# Patient Record
Sex: MALE | Race: BLACK OR AFRICAN AMERICAN | NOT HISPANIC OR LATINO | Employment: FULL TIME | URBAN - METROPOLITAN AREA
[De-identification: names, ages, dates, MRNs, and addresses within clinical notes are randomized per-mention and may not be internally consistent; named-entity substitution may affect disease eponyms.]

---

## 2019-11-09 ENCOUNTER — HOSPITAL ENCOUNTER (EMERGENCY)
Facility: HOSPITAL | Age: 54
Discharge: HOME | End: 2019-11-10
Attending: EMERGENCY MEDICINE | Admitting: EMERGENCY MEDICINE
Payer: COMMERCIAL

## 2019-11-09 ENCOUNTER — APPOINTMENT (EMERGENCY)
Dept: RADIOLOGY | Facility: HOSPITAL | Age: 54
End: 2019-11-09
Payer: COMMERCIAL

## 2019-11-09 DIAGNOSIS — R56.9 NEW ONSET SEIZURE (HCC): Primary | ICD-10-CM

## 2019-11-09 LAB
ALBUMIN SERPL BCP-MCNC: 3.5 G/DL (ref 3.5–5)
ALP SERPL-CCNC: 98 U/L (ref 46–116)
ALT SERPL W P-5'-P-CCNC: 17 U/L (ref 12–78)
ANION GAP SERPL CALCULATED.3IONS-SCNC: 6 MMOL/L (ref 4–13)
AST SERPL W P-5'-P-CCNC: 16 U/L (ref 5–45)
BASOPHILS # BLD AUTO: 0.04 THOUSANDS/ΜL (ref 0–0.1)
BASOPHILS NFR BLD AUTO: 0 % (ref 0–1)
BILIRUB SERPL-MCNC: 0.7 MG/DL (ref 0.2–1)
BUN SERPL-MCNC: 17 MG/DL (ref 5–25)
CALCIUM SERPL-MCNC: 8.4 MG/DL (ref 8.3–10.1)
CHLORIDE SERPL-SCNC: 104 MMOL/L (ref 100–108)
CO2 SERPL-SCNC: 28 MMOL/L (ref 21–32)
CREAT SERPL-MCNC: 1.24 MG/DL (ref 0.6–1.3)
EOSINOPHIL # BLD AUTO: 0.13 THOUSAND/ΜL (ref 0–0.61)
EOSINOPHIL NFR BLD AUTO: 1 % (ref 0–6)
ERYTHROCYTE [DISTWIDTH] IN BLOOD BY AUTOMATED COUNT: 13.7 % (ref 11.6–15.1)
GFR SERPL CREATININE-BSD FRML MDRD: 65 ML/MIN/1.73SQ M
GLUCOSE SERPL-MCNC: 97 MG/DL (ref 65–140)
HCT VFR BLD AUTO: 44.3 % (ref 36.5–49.3)
HGB BLD-MCNC: 14.2 G/DL (ref 12–17)
IMM GRANULOCYTES # BLD AUTO: 0.03 THOUSAND/UL (ref 0–0.2)
IMM GRANULOCYTES NFR BLD AUTO: 0 % (ref 0–2)
LYMPHOCYTES # BLD AUTO: 3.4 THOUSANDS/ΜL (ref 0.6–4.47)
LYMPHOCYTES NFR BLD AUTO: 31 % (ref 14–44)
MCH RBC QN AUTO: 29.6 PG (ref 26.8–34.3)
MCHC RBC AUTO-ENTMCNC: 32.1 G/DL (ref 31.4–37.4)
MCV RBC AUTO: 92 FL (ref 82–98)
MONOCYTES # BLD AUTO: 1.01 THOUSAND/ΜL (ref 0.17–1.22)
MONOCYTES NFR BLD AUTO: 9 % (ref 4–12)
NEUTROPHILS # BLD AUTO: 6.4 THOUSANDS/ΜL (ref 1.85–7.62)
NEUTS SEG NFR BLD AUTO: 59 % (ref 43–75)
NRBC BLD AUTO-RTO: 0 /100 WBCS
PLATELET # BLD AUTO: 204 THOUSANDS/UL (ref 149–390)
PMV BLD AUTO: 11.2 FL (ref 8.9–12.7)
POTASSIUM SERPL-SCNC: 3.6 MMOL/L (ref 3.5–5.3)
PROT SERPL-MCNC: 7.1 G/DL (ref 6.4–8.2)
RBC # BLD AUTO: 4.8 MILLION/UL (ref 3.88–5.62)
SODIUM SERPL-SCNC: 138 MMOL/L (ref 136–145)
TROPONIN I SERPL-MCNC: <0.02 NG/ML
WBC # BLD AUTO: 11.01 THOUSAND/UL (ref 4.31–10.16)

## 2019-11-09 PROCEDURE — 85025 COMPLETE CBC W/AUTO DIFF WBC: CPT | Performed by: EMERGENCY MEDICINE

## 2019-11-09 PROCEDURE — 84484 ASSAY OF TROPONIN QUANT: CPT | Performed by: EMERGENCY MEDICINE

## 2019-11-09 PROCEDURE — 70450 CT HEAD/BRAIN W/O DYE: CPT

## 2019-11-09 PROCEDURE — 93005 ELECTROCARDIOGRAM TRACING: CPT

## 2019-11-09 PROCEDURE — 71045 X-RAY EXAM CHEST 1 VIEW: CPT

## 2019-11-09 PROCEDURE — 36415 COLL VENOUS BLD VENIPUNCTURE: CPT

## 2019-11-09 PROCEDURE — 99285 EMERGENCY DEPT VISIT HI MDM: CPT

## 2019-11-09 PROCEDURE — 80053 COMPREHEN METABOLIC PANEL: CPT | Performed by: EMERGENCY MEDICINE

## 2019-11-09 RX ORDER — DIPHENOXYLATE HYDROCHLORIDE AND ATROPINE SULFATE 2.5; .025 MG/1; MG/1
1 TABLET ORAL DAILY
COMMUNITY

## 2019-11-10 VITALS
HEART RATE: 76 BPM | DIASTOLIC BLOOD PRESSURE: 80 MMHG | OXYGEN SATURATION: 98 % | RESPIRATION RATE: 19 BRPM | WEIGHT: 285 LBS | SYSTOLIC BLOOD PRESSURE: 149 MMHG | TEMPERATURE: 100 F

## 2019-11-10 NOTE — DISCHARGE INSTRUCTIONS
You cannot drive until you are cleared by a physician  Return to the ER for further concerns  Follow up with the neurologist provided and your primary care physician in 1-2 days

## 2019-11-10 NOTE — ED NOTES
Pt with no complaints, able to sit on edge of bed and ambulate without dizziness  Instructed not to drive until f/u with neurologist, verbalized understanding  Ambulated out to car with wife and this RN, gait steady       Hannah Press, RN  11/10/19 0745

## 2019-11-11 ENCOUNTER — TELEPHONE (OUTPATIENT)
Dept: NEUROLOGY | Facility: CLINIC | Age: 54
End: 2019-11-11

## 2019-11-11 NOTE — TELEPHONE ENCOUNTER
Best contact number for patient: [phone]    Emergency Contact name and number: Radha Carlos(Wife) [phone]    Referring provider: [facility] ED    Referring provider Telephone:     Primary Care Provider Name: No PCP states wife    Reason for Appointment/Dx: New onset SZ    Neurology Location patient would like to be seen: [facility] received? Yes                                                 Records Received? No records  Have you ever seen another Neurologist? No  Insurance Information    Insurance Name: [facility]    ID/Policy #: [id number]    Secondary Insurance:    ID/Policy#: Workman's Comp/ Accident/ School  Information      Workman's Comp/Accident/School related?  No    If yes name of Insurance company:    Date of Injury:    Open Claim:     Name and Telephone Number:    Notes:                   Appointment date: _____________________________

## 2019-11-12 LAB
ATRIAL RATE: 88 BPM
P AXIS: 33 DEGREES
PR INTERVAL: 220 MS
QRS AXIS: 21 DEGREES
QRSD INTERVAL: 72 MS
QT INTERVAL: 372 MS
QTC INTERVAL: 450 MS
T WAVE AXIS: 0 DEGREES
VENTRICULAR RATE: 88 BPM

## 2019-11-12 PROCEDURE — 93010 ELECTROCARDIOGRAM REPORT: CPT | Performed by: INTERNAL MEDICINE

## 2019-11-14 NOTE — ED PROVIDER NOTES
History  Chief Complaint   Patient presents with    Seizure - New Onset     wife states patient was in bed, turned off TV and she thought he was asleep, pt suddenly grabbed his chest and started with shaking , jaw was clenched, pt was shaking for a few minutes, not responding  On medic arrival pt was confused and combative, arrives in ER alert and oriented     Pt arrives in the ER with EMS  His wife states that they were laying in bed, pt was asleep, and he suddenly clutched his chest, froze in place, with his jaw clenched and started twitching  Pt was unresponsive to verbal stimuli during the episode  Pt's wife called EMS, and the police arrived to a combative person  Pt later returned to his baseline, ambulated to the restroom without assistance prior to coming to the hospital  Pt denies a hx of seizures  He denies drug use or medication non compliance  He denies ETOH abuse, states that he is a social drinker  Pt is awake and alert, complains of mild pain in his tongue on initial eval        History provided by:  Patient and spouse   used: No    Seizure - New Onset   Seizure activity on arrival: no    Seizure type:  Tonic  Preceding symptoms: no dizziness, no headache, no nausea and no panic    Episode characteristics: abnormal movements, combativeness, generalized shaking, stiffening, tongue biting and unresponsiveness    Episode characteristics: no incontinence    Postictal symptoms: confusion    Return to baseline: yes    Severity:  Moderate  Timing:  Once  Context: not alcohol withdrawal, not change in medication, not drug use, not family hx of seizures, not fever, medical compliance, not possible medication ingestion and not previous head injury    Recent head injury:  No recent head injuries  PTA treatment:  None  History of seizures: no        Prior to Admission Medications   Prescriptions Last Dose Informant Patient Reported?  Taking?   multivitamin (THERAGRAN) TABS 11/9/2019 at Unknown time  Yes Yes   Sig: Take 1 tablet by mouth daily      Facility-Administered Medications: None       History reviewed  No pertinent past medical history  Past Surgical History:   Procedure Laterality Date    KNEE ARTHROSCOPY Right        History reviewed  No pertinent family history  I have reviewed and agree with the history as documented  Social History     Tobacco Use    Smoking status: Never Smoker    Smokeless tobacco: Never Used   Substance Use Topics    Alcohol use: Yes     Comment: socially    Drug use: Never        Review of Systems   Constitutional: Negative for chills and fever  Respiratory: Negative for cough, shortness of breath and wheezing  Cardiovascular: Negative for chest pain and palpitations  Gastrointestinal: Negative for abdominal pain, constipation, diarrhea, nausea and vomiting  Genitourinary: Negative for dysuria, flank pain, hematuria and urgency  Musculoskeletal: Negative for back pain  Skin: Negative for color change and rash  Neurological: Positive for seizures  All other systems reviewed and are negative  Physical Exam  Physical Exam   Constitutional: He is oriented to person, place, and time  He appears well-developed and well-nourished  HENT:   Head: Normocephalic and atraumatic  Abrasions noted to both sides of tongue  No laceration noted  No active bleeding   Eyes: Pupils are equal, round, and reactive to light  EOM are normal    Cardiovascular: Normal rate, regular rhythm and normal heart sounds  Pulmonary/Chest: Effort normal and breath sounds normal    Abdominal: Soft  Bowel sounds are normal  He exhibits no distension and no mass  There is no tenderness  There is no rebound and no guarding  Neurological: He is alert and oriented to person, place, and time  No cranial nerve deficit  Coordination normal    Skin: Skin is warm and dry  Psychiatric: He has a normal mood and affect   His behavior is normal  Judgment and thought content normal    Nursing note and vitals reviewed        Vital Signs  ED Triage Vitals [11/09/19 2231]   Temperature Pulse Respirations Blood Pressure SpO2   100 °F (37 8 °C) 94 20 153/71 95 %      Temp Source Heart Rate Source Patient Position - Orthostatic VS BP Location FiO2 (%)   Tympanic Monitor Sitting Left arm --      Pain Score       No Pain           Vitals:    11/10/19 0115 11/10/19 0130 11/10/19 0145 11/10/19 0230   BP: 154/74 127/78 143/81 149/80   Pulse: 78 84 80 76   Patient Position - Orthostatic VS: Lying            Visual Acuity  Visual Acuity      Most Recent Value   L Pupil Size (mm)  3   R Pupil Size (mm)  3          ED Medications  Medications - No data to display    Diagnostic Studies  Results Reviewed     Procedure Component Value Units Date/Time    Troponin I [[id number]]  (Normal) Collected:  11/09/19 2243    Lab Status:  Final result Specimen:  Blood from Arm, Right Updated:  11/09/19 2312     Troponin I <0 02 ng/mL     Comprehensive metabolic panel [[id number]] Collected:  11/09/19 2243    Lab Status:  Final result Specimen:  Blood from Arm, Right Updated:  11/09/19 2309     Sodium 138 mmol/L      Potassium 3 6 mmol/L      Chloride 104 mmol/L      CO2 28 mmol/L      ANION GAP 6 mmol/L      BUN 17 mg/dL      Creatinine 1 24 mg/dL      Glucose 97 mg/dL      Calcium 8 4 mg/dL      AST 16 U/L      ALT 17 U/L      Alkaline Phosphatase 98 U/L      Total Protein 7 1 g/dL      Albumin 3 5 g/dL      Total Bilirubin 0 70 mg/dL      eGFR 65 ml/min/1 73sq m     Narrative:       [place] guidelines for Chronic Kidney Disease (CKD):     Stage 1 with normal or high GFR (GFR > 90 mL/min/1 73 square meters)    Stage 2 Mild CKD (GFR = 60-89 mL/min/1 73 square meters)    Stage 3A Moderate CKD (GFR = 45-59 mL/min/1 73 square meters)    Stage 3B Moderate CKD (GFR = 30-44 mL/min/1 73 square meters)    Stage 4 Severe CKD (GFR = 15-29 mL/min/1 73 square meters)    Stage 5 End Stage CKD (GFR <15 mL/min/1 73 square meters)  Note: GFR calculation is accurate only with a steady state creatinine    CBC and differential [[id number]]  (Abnormal) Collected:  11/09/19 2243    Lab Status:  Final result Specimen:  Blood from Arm, Right Updated:  11/09/19 2300     WBC 11 01 Thousand/uL      RBC 4 80 Million/uL      Hemoglobin 14 2 g/dL      Hematocrit 44 3 %      MCV 92 fL      MCH 29 6 pg      MCHC 32 1 g/dL      RDW 13 7 %      MPV 11 2 fL      Platelets 581 Thousands/uL      nRBC 0 /100 WBCs      Neutrophils Relative 59 %      Immat GRANS % 0 %      Lymphocytes Relative 31 %      Monocytes Relative 9 %      Eosinophils Relative 1 %      Basophils Relative 0 %      Neutrophils Absolute 6 40 Thousands/µL      Immature Grans Absolute 0 03 Thousand/uL      Lymphocytes Absolute 3 40 Thousands/µL      Monocytes Absolute 1 01 Thousand/µL      Eosinophils Absolute 0 13 Thousand/µL      Basophils Absolute 0 04 Thousands/µL                  CT head without contrast   Final Result by Erick Finn DO (11/10 0142)      No acute intracranial abnormality is seen  Other findings as above  Workstation performed: [id number]         XR chest 1 view portable   ED Interpretation by Samreen Morales DO (11/10 7508)   nad      Final Result by Colin Garcia MD (11/10 1275)      No acute cardiopulmonary disease        Workstation performed: [id number]                    Procedures  ECG 12 Lead Documentation Only  Date/Time: 11/9/2019 10:45 PM  Performed by: Samreen Morales DO  Authorized by: Samreen Morales DO     Indications / Diagnosis:  Seizure  ECG reviewed by me, the ED Provider: yes    Patient location:  ED  Previous ECG:     Previous ECG:  Unavailable    Comparison to cardiac monitor: Yes    Interpretation:     Interpretation: non-specific    Rate:     ECG rate:  88bpm    ECG rate assessment: normal    Rhythm:     Rhythm: sinus rhythm    Ectopy:     Ectopy: none    QRS:     QRS axis:  Normal  Conduction:     Conduction: abnormal      Abnormal conduction: 1st degree    ST segments:     ST segments:  Normal  T waves:     T waves: normal             ED Course                               MDM  Number of Diagnoses or Management Options  New onset seizure [facility]):   Diagnosis management comments: Pt remains asymptomatic in the ER  Will d/c to home with PCP and neurology f/u  Amount and/or Complexity of Data Reviewed  Clinical lab tests: ordered and reviewed  Tests in the radiology section of CPT®: ordered and reviewed    Risk of Complications, Morbidity, and/or Mortality  Presenting problems: high  Diagnostic procedures: high  Management options: moderate    Patient Progress  Patient progress: stable      Disposition  Final diagnoses:   New onset seizure ([address] )     Time reflects when diagnosis was documented in both MDM as applicable and the Disposition within this note     Time User Action Codes Description Comment    11/10/2019  2:06 AM Ellen Mancini Add [R56 9] New onset seizure [facility])       ED Disposition     ED Disposition Condition Date/Time Comment    Discharge Stable Sun Nov 10, 2019  2:06 AM Rosibel Hamilton discharge to home/self care  Follow-up Information     Follow up With Specialties Details Why Andriy Schedule an appointment as soon as possible for a visit in 1 day for follow up [address] Francisco Guillory MD Neurology Schedule an appointment as soon as possible for a visit in 1 day for follow up [address]  [phone]            Discharge Medication List as of 11/10/2019  2:08 AM      CONTINUE these medications which have NOT CHANGED    Details   multivitamin (THERAGRAN) TABS Take 1 tablet by mouth daily, Historical Med           No discharge procedures on file      ED Provider  Electronically Signed by           Paul Dobbs Cam Tapia, DO  11/14/19 1124

## 2019-12-26 ENCOUNTER — HOSPITAL ENCOUNTER (EMERGENCY)
Facility: HOSPITAL | Age: 54
Discharge: HOME | End: 2019-12-26
Attending: EMERGENCY MEDICINE | Admitting: EMERGENCY MEDICINE
Payer: COMMERCIAL

## 2019-12-26 ENCOUNTER — APPOINTMENT (EMERGENCY)
Dept: RADIOLOGY | Facility: HOSPITAL | Age: 54
End: 2019-12-26
Payer: COMMERCIAL

## 2019-12-26 VITALS
WEIGHT: 285 LBS | RESPIRATION RATE: 16 BRPM | HEART RATE: 82 BPM | OXYGEN SATURATION: 96 % | DIASTOLIC BLOOD PRESSURE: 86 MMHG | SYSTOLIC BLOOD PRESSURE: 153 MMHG | TEMPERATURE: 99 F

## 2019-12-26 DIAGNOSIS — R56.9 SEIZURE (HCC): Primary | ICD-10-CM

## 2019-12-26 LAB
ALBUMIN SERPL BCP-MCNC: 3.7 G/DL (ref 3.5–5)
ALP SERPL-CCNC: 92 U/L (ref 46–116)
ALT SERPL W P-5'-P-CCNC: 18 U/L (ref 12–78)
ANION GAP SERPL CALCULATED.3IONS-SCNC: 7 MMOL/L (ref 4–13)
AST SERPL W P-5'-P-CCNC: 18 U/L (ref 5–45)
ATRIAL RATE: 89 BPM
BASOPHILS # BLD AUTO: 0.02 THOUSANDS/ΜL (ref 0–0.1)
BASOPHILS NFR BLD AUTO: 0 % (ref 0–1)
BILIRUB SERPL-MCNC: 0.6 MG/DL (ref 0.2–1)
BUN SERPL-MCNC: 15 MG/DL (ref 5–25)
CALCIUM SERPL-MCNC: 8.7 MG/DL (ref 8.3–10.1)
CHLORIDE SERPL-SCNC: 105 MMOL/L (ref 100–108)
CO2 SERPL-SCNC: 29 MMOL/L (ref 21–32)
CREAT SERPL-MCNC: 1.28 MG/DL (ref 0.6–1.3)
EOSINOPHIL # BLD AUTO: 0.1 THOUSAND/ΜL (ref 0–0.61)
EOSINOPHIL NFR BLD AUTO: 1 % (ref 0–6)
ERYTHROCYTE [DISTWIDTH] IN BLOOD BY AUTOMATED COUNT: 14.1 % (ref 11.6–15.1)
GFR SERPL CREATININE-BSD FRML MDRD: 72 ML/MIN/1.73SQ M
GLUCOSE SERPL-MCNC: 100 MG/DL (ref 65–140)
HCT VFR BLD AUTO: 45.2 % (ref 36.5–49.3)
HGB BLD-MCNC: 15.4 G/DL (ref 12–17)
HOLD SPECIMEN: NORMAL
LYMPHOCYTES # BLD AUTO: 2.38 THOUSANDS/ΜL (ref 0.6–4.47)
LYMPHOCYTES NFR BLD AUTO: 24 % (ref 14–44)
MAGNESIUM SERPL-MCNC: 2.5 MG/DL (ref 1.6–2.6)
MCH RBC QN AUTO: 30.1 PG (ref 26.8–34.3)
MCHC RBC AUTO-ENTMCNC: 34.1 G/DL (ref 31.4–37.4)
MCV RBC AUTO: 88 FL (ref 82–98)
MONOCYTES # BLD AUTO: 0.65 THOUSAND/ΜL (ref 0.17–1.22)
MONOCYTES NFR BLD AUTO: 7 % (ref 4–12)
NEUTROPHILS # BLD AUTO: 6.66 THOUSANDS/ΜL (ref 1.85–7.62)
NEUTS SEG NFR BLD AUTO: 68 % (ref 43–75)
P AXIS: 38 DEGREES
PLATELET # BLD AUTO: 200 THOUSANDS/UL (ref 149–390)
PMV BLD AUTO: 11.1 FL (ref 8.9–12.7)
POTASSIUM SERPL-SCNC: 4.4 MMOL/L (ref 3.5–5.3)
PR INTERVAL: 200 MS
PROT SERPL-MCNC: 7.4 G/DL (ref 6.4–8.2)
QRS AXIS: 20 DEGREES
QRSD INTERVAL: 82 MS
QT INTERVAL: 364 MS
QTC INTERVAL: 442 MS
RBC # BLD AUTO: 5.12 MILLION/UL (ref 3.88–5.62)
SODIUM SERPL-SCNC: 141 MMOL/L (ref 136–145)
T WAVE AXIS: 10 DEGREES
VENTRICULAR RATE: 89 BPM
WBC # BLD AUTO: 9.81 THOUSAND/UL (ref 4.31–10.16)

## 2019-12-26 PROCEDURE — 85025 COMPLETE CBC W/AUTO DIFF WBC: CPT | Performed by: EMERGENCY MEDICINE

## 2019-12-26 PROCEDURE — 99284 EMERGENCY DEPT VISIT MOD MDM: CPT

## 2019-12-26 PROCEDURE — 93010 ELECTROCARDIOGRAM REPORT: CPT | Performed by: INTERNAL MEDICINE

## 2019-12-26 PROCEDURE — 80053 COMPREHEN METABOLIC PANEL: CPT | Performed by: EMERGENCY MEDICINE

## 2019-12-26 PROCEDURE — 93005 ELECTROCARDIOGRAM TRACING: CPT

## 2019-12-26 PROCEDURE — 71045 X-RAY EXAM CHEST 1 VIEW: CPT

## 2019-12-26 PROCEDURE — 83735 ASSAY OF MAGNESIUM: CPT | Performed by: EMERGENCY MEDICINE

## 2019-12-26 PROCEDURE — 36415 COLL VENOUS BLD VENIPUNCTURE: CPT | Performed by: EMERGENCY MEDICINE

## 2019-12-26 NOTE — ED NOTES
Pt given water, no difficulty swallowing noted  Pt in high fowlers, no distress noted  Observation maintained       Juliana Wiggins RN  12/26/19 4354

## 2019-12-26 NOTE — ED PROVIDER NOTES
History  Chief Complaint   Patient presents with    Seizure - Prior Hx Of     Pt wife noticed Pt seizing in bed and called 911  Seizure had stopped by the time medics arrived  Pt presents after a witnessed seizure this morning  Pt has a previous hx of 1 seizure in Nov, and has been following with neurology at [facility]  Pt had an outpt MRI, EEG and is scheduled for a f/u appt in Jan  Pt was prescribed Briviact, which he is yet to start taking  Per wife, he returned from the restroom, they were both falling back asleep when seizure activity began  Pt with clonic movements, symptoms lasted for 1-2 mins  EMS arrived, during post ictal phase, and pt awakened enough to walk to the ambulance  On arrival, pt is awake and alert  He denies somatic complaints  He denies recent illness/chills  History provided by:  Patient and spouse   used: No        Prior to Admission Medications   Prescriptions Last Dose Informant Patient Reported? Taking?   multivitamin (THERAGRAN) TABS Past Week at Unknown time  Yes Yes   Sig: Take 1 tablet by mouth daily      Facility-Administered Medications: None       Past Medical History:   Diagnosis Date    Seizures ([address] ) 11/09/2019       Past Surgical History:   Procedure Laterality Date    KNEE ARTHROSCOPY Right        History reviewed  No pertinent family history  I have reviewed and agree with the history as documented  Social History     Tobacco Use    Smoking status: Never Smoker    Smokeless tobacco: Never Used   Substance Use Topics    Alcohol use: Yes     Comment: socially    Drug use: Never        Review of Systems   Constitutional: Negative for chills and fever  Respiratory: Negative for cough, shortness of breath and wheezing  Cardiovascular: Negative for chest pain and palpitations  Gastrointestinal: Negative for abdominal pain, constipation, diarrhea, nausea and vomiting     Genitourinary: Negative for dysuria, flank pain, hematuria and urgency  Musculoskeletal: Negative for back pain  Skin: Negative for color change and rash  Neurological: Positive for seizures  Negative for dizziness, facial asymmetry and headaches  All other systems reviewed and are negative  Physical Exam  Physical Exam   Constitutional: He is oriented to person, place, and time  He appears well-developed and well-nourished  HENT:   Head: Normocephalic and atraumatic  Eyes: Pupils are equal, round, and reactive to light  EOM are normal    Cardiovascular: Normal rate, regular rhythm and normal heart sounds  Pulmonary/Chest: Effort normal and breath sounds normal    Abdominal: Soft  Bowel sounds are normal  He exhibits no distension and no mass  There is no tenderness  There is no rebound and no guarding  Neurological: He is alert and oriented to person, place, and time  Skin: Skin is warm and dry  Psychiatric: He has a normal mood and affect  His behavior is normal  Judgment and thought content normal    Nursing note and vitals reviewed        Vital Signs  ED Triage Vitals [12/26/19 0623]   Temperature Pulse Respirations Blood Pressure SpO2   99 °F (37 2 °C) 91 18 134/72 98 %      Temp Source Heart Rate Source Patient Position - Orthostatic VS BP Location FiO2 (%)   Oral Monitor Lying Left arm --      Pain Score       No Pain           Vitals:    12/26/19 0623 12/26/19 0710 12/26/19 0800   BP: 134/72 144/82 153/86   Pulse: 91 86 82   Patient Position - Orthostatic VS: Lying Sitting Sitting         Visual Acuity  Visual Acuity      Most Recent Value   L Pupil Size (mm)  3   R Pupil Size (mm)  3          ED Medications  Medications - No data to display    Diagnostic Studies  Results Reviewed     Procedure Component Value Units Date/Time    Comprehensive metabolic panel [[id number]] Collected:  12/26/19 0710    Lab Status:  Final result Specimen:  Blood from Arm, Right Updated:  12/26/19 0739     Sodium 141 mmol/L      Potassium 4 4 mmol/L      Chloride 105 mmol/L      CO2 29 mmol/L      ANION GAP 7 mmol/L      BUN 15 mg/dL      Creatinine 1 28 mg/dL      Glucose 100 mg/dL      Calcium 8 7 mg/dL      AST 18 U/L      ALT 18 U/L      Alkaline Phosphatase 92 U/L      Total Protein 7 4 g/dL      Albumin 3 7 g/dL      Total Bilirubin 0 60 mg/dL      eGFR 72 ml/min/1 73sq m     Narrative:       National Kidney Disease Foundation guidelines for Chronic Kidney Disease (CKD):     Stage 1 with normal or high GFR (GFR > 90 mL/min/1 73 square meters)    Stage 2 Mild CKD (GFR = 60-89 mL/min/1 73 square meters)    Stage 3A Moderate CKD (GFR = 45-59 mL/min/1 73 square meters)    Stage 3B Moderate CKD (GFR = 30-44 mL/min/1 73 square meters)    Stage 4 Severe CKD (GFR = 15-29 mL/min/1 73 square meters)    Stage 5 End Stage CKD (GFR <15 mL/min/1 73 square meters)  Note: GFR calculation is accurate only with a steady state creatinine    Magnesium [[id number]]  (Normal) Collected:  12/26/19 0710    Lab Status:  Final result Specimen:  Blood from Arm, Right Updated:  12/26/19 0739     Magnesium 2 5 mg/dL     CBC and differential [[id number]]  (Normal) Collected:  12/26/19 0633    Lab Status:  Final result Specimen:  Blood from Arm, Left Updated:  12/26/19 0640     WBC 9 81 Thousand/uL      RBC 5 12 Million/uL      Hemoglobin 15 4 g/dL      Hematocrit 45 2 %      MCV 88 fL      MCH 30 1 pg      MCHC 34 1 g/dL      RDW 14 1 %      MPV 11 1 fL      Platelets 857 Thousands/uL      Neutrophils Relative 68 %      Lymphocytes Relative 24 %      Monocytes Relative 7 %      Eosinophils Relative 1 %      Basophils Relative 0 %      Neutrophils Absolute 6 66 Thousands/µL      Lymphocytes Absolute 2 38 Thousands/µL      Monocytes Absolute 0 65 Thousand/µL      Eosinophils Absolute 0 10 Thousand/µL      Basophils Absolute 0 02 Thousands/µL                  XR chest 1 view portable   Final Result by Carver Gowers, MD (12/26 9405)      No acute cardiopulmonary disease              Workstation performed: [id number]                    Procedures  ECG 12 Lead Documentation Only  Date/Time: 12/26/2019 6:41 AM  Performed by: Tiny Mcintosh DO  Authorized by: Tiny Mcintosh DO     Indications / Diagnosis:  Seizure  ECG reviewed by me, the ED Provider: yes    Patient location:  ED  Previous ECG:     Previous ECG:  Unavailable    Comparison to cardiac monitor: Yes    Interpretation:     Interpretation: non-specific    Rate:     ECG rate:  89bpm    ECG rate assessment: normal    Rhythm:     Rhythm: sinus rhythm    Ectopy:     Ectopy: none    QRS:     QRS axis:  Normal  Conduction:     Conduction: normal    ST segments:     ST segments:  Normal  T waves:     T waves: normal               ED Course                               MDM      Disposition  Final diagnoses:   Seizure ([address] )     Time reflects when diagnosis was documented in both MDM as applicable and the Disposition within this note     Time User Action Codes Description Comment    12/26/2019  7:51 AM Sharyle Brunt Add [R56 9] Seizure [facility])       ED Disposition     ED Disposition Condition Date/Time Comment    Discharge Stable Thu Dec 26, 2019  7:51 AM Xavier Acosta discharge to home/self care  Follow-up Information     Follow up With Specialties Details Why [address] [facility] Internal Medicine Schedule an appointment as soon as possible for a visit   [address] [address] [address] [phone]            Discharge Medication List as of 12/26/2019  7:52 AM      CONTINUE these medications which have NOT CHANGED    Details   multivitamin (THERAGRAN) TABS Take 1 tablet by mouth daily, Historical Med           No discharge procedures on file      ED Provider  Electronically Signed by           Tiny Mcintosh DO  12/27/19 7168

## 2020-01-20 ENCOUNTER — OFFICE VISIT (OUTPATIENT)
Dept: ENDOCRINOLOGY | Facility: CLINIC | Age: 55
End: 2020-01-20
Payer: COMMERCIAL

## 2020-01-20 VITALS
BODY MASS INDEX: 38.6 KG/M2 | HEIGHT: 72 IN | DIASTOLIC BLOOD PRESSURE: 78 MMHG | WEIGHT: 285 LBS | HEART RATE: 70 BPM | SYSTOLIC BLOOD PRESSURE: 140 MMHG

## 2020-01-20 DIAGNOSIS — E23.7 PITUITARY LESION (HCC): Primary | ICD-10-CM

## 2020-01-20 PROCEDURE — 99203 OFFICE O/P NEW LOW 30 MIN: CPT | Performed by: INTERNAL MEDICINE

## 2020-01-20 RX ORDER — BRIVARACETAM 50 MG/1
50 TABLET, FILM COATED ORAL 2 TIMES DAILY
COMMUNITY
Start: 2019-12-26

## 2020-01-20 NOTE — PROGRESS NOTES
ENDOCRINOLOGY  NEW PATIENT H&P     ? Reason for Endocrine Consult/Chief Complaint: pituitary disorder        Medical Decision Making:     Impression  1  Pituitary asymmetry       Recommendations: The left pituitary gland is asymmetrically larger than the right  There is no focal pituitary lesion  Will test for hormonal hypersecretion or hyposecretion  Will check prolactin, 8AM cortisol, TSH, FT4, IGF-1, testosterone, FSH/LH  Will need repeat MRI in July 2020 to assess for stability  Counseled on risk of pituitary lesions affecting optic nerve  He will have the CD with the films uploaded through [facility]  RTC 6 months    Rashad CRUZ  History of Present Illness:   Mr Marco Gregory is a 62year old male who presents for pituitary disorder evaluation  Has been diagnosed with new onset seizures in Nov 2019  Now on anti-seizure medication  Had MRI brain Jan 2020 and there was pituitary stalk deviation towards the left  The left pituitary gland asymmetrically larger compared to right  No focal pituitary lesion  No headaches  No visual symptoms or changes  Prolactin was reported to be checked and normal   ?  PMH-seizure disorder  PSH-knee surgery  FHx-mother with thyroid disorder   SHx-neg x 3, works at [employer]           ? Review of Systems:     Review of Systems   Constitutional: Negative for appetite change, chills, diaphoresis, fatigue, fever and unexpected weight change  HENT: Negative for congestion, ear pain, hearing loss, rhinorrhea, sinus pressure, sinus pain, sore throat, trouble swallowing and voice change  Eyes: Negative for photophobia, redness and visual disturbance  Respiratory: Negative for apnea, cough, chest tightness, shortness of breath, wheezing and stridor  Cardiovascular: Negative for chest pain, palpitations and leg swelling  Gastrointestinal: Negative for abdominal distention, abdominal pain, constipation, diarrhea, nausea and vomiting  Endocrine: Negative for cold intolerance, heat intolerance, polydipsia, polyphagia and polyuria  Genitourinary: Negative for difficulty urinating, dysuria, flank pain, frequency, hematuria and urgency  Musculoskeletal: Negative for arthralgias, back pain, gait problem, joint swelling and myalgias  Skin: Negative for color change, pallor, rash and wound  Allergic/Immunologic: Negative for immunocompromised state  Neurological: Negative for dizziness, tremors, syncope, weakness, light-headedness and headaches  +seizures  Hematological: Negative for adenopathy  Does not bruise/bleed easily  Psychiatric/Behavioral: Negative for confusion and sleep disturbance  The patient is not nervous/anxious  ?   Patient History:     Past Medical History:   Diagnosis Date    [facility]     Past Surgical History:   Procedure Laterality Date    KNEE ARTHROSCOPY Right      Social History     Socioeconomic History    Marital status: /Civil Union     Spouse name: Not on file    Number of children: Not on file    Years of education: Not on file    Highest education level: Not on file   Occupational History    Not on file   Social Needs    Financial resource strain: Not on file    Food insecurity:     Worry: Not on file     Inability: Not on file    Transportation needs:     Medical: Not on file     Non-medical: Not on file   Tobacco Use    Smoking status: Never Smoker    Smokeless tobacco: Never Used   Substance and Sexual Activity    Alcohol use: Yes     Comment: socially    Drug use: Never    Sexual activity: Yes   Lifestyle    Physical activity:     Days per week: Not on file     Minutes per session: Not on file    Stress: Not on file   Relationships    Social connections:     Talks on phone: Not on file     Gets together: Not on file     Attends [employer] witness service: Not on file     Active member of club or organization: Not on file     Attends meetings of clubs or organizations: Not on file     Relationship status: Not on file    Intimate partner violence:     Fear of current or ex partner: Not on file     Emotionally abused: Not on file     Physically abused: Not on file     Forced sexual activity: Not on file   Other Topics Concern    Not on file   Social History Narrative    Not on file     Family History   Problem Relation Age of Onset    Thyroid disease unspecified Mother     Hypertension Father     Heart disease Father        Current Medications: At the time this note was written these were the medications the patient was on  Current Outpatient Medications   Medication Sig Dispense Refill    BRIVIACT 50 MG TABS tablet Take 50 mg by mouth 2 (two) times a day      multivitamin (THERAGRAN) TABS Take 1 tablet by mouth daily       No current facility-administered medications for this visit  Allergies: Patient has no known allergies  Physical Exam:   Vital Signs:   /78   Pulse 70   Ht 6' (1 829 m)   Wt 129 kg (285 lb)   BMI 38 65 kg/m²     Physical Exam   Constitutional: He is oriented to person, place, and time  He appears well-developed and well-nourished  HENT:   Head: Normocephalic and atraumatic  Nose: Nose normal    Mouth/Throat: Oropharynx is clear and moist  No oropharyngeal exudate  Eyes: Pupils are equal, round, and reactive to light  Conjunctivae and EOM are normal  No scleral icterus  Visual fields normal bilaterally   Neck: Normal range of motion  Neck supple  No thyromegaly present  Cardiovascular: Normal rate, regular rhythm and normal heart sounds  Exam reveals no gallop and no friction rub  No murmur heard  Pulmonary/Chest: Effort normal and breath sounds normal  No stridor  No respiratory distress  He has no wheezes  He has no rales  Abdominal: Soft  Bowel sounds are normal  He exhibits no distension and no mass  There is no tenderness  There is no rebound and no guarding  Musculoskeletal: Normal range of motion   He exhibits no edema  Lymphadenopathy:     He has no cervical adenopathy  Neurological: He is alert and oriented to person, place, and time  Skin: Skin is warm and dry  No rash noted  No erythema  No pallor  Psychiatric: He has a normal mood and affect   His behavior is normal  Judgment and thought content normal         Labs and Imaging:      ?no recent labs    MRI scanned into media

## 2020-01-20 NOTE — PATIENT INSTRUCTIONS
Have labs done at [address] [place] [address], I will call you with the results    Follow up in 6 months

## 2020-01-26 LAB
ALBUMIN SERPL-MCNC: 4.3 G/DL (ref 3.8–4.9)
ALBUMIN/GLOB SERPL: 1.9 {RATIO} (ref 1.2–2.2)
ALP SERPL-CCNC: 94 IU/L (ref 39–117)
ALT SERPL-CCNC: 10 IU/L (ref 0–44)
AST SERPL-CCNC: 18 IU/L (ref 0–40)
BILIRUB SERPL-MCNC: 1 MG/DL (ref 0–1.2)
BUN SERPL-MCNC: 14 MG/DL (ref 6–24)
BUN/CREAT SERPL: 14 (ref 9–20)
CALCIUM SERPL-MCNC: 9.3 MG/DL (ref 8.7–10.2)
CHLORIDE SERPL-SCNC: 102 MMOL/L (ref 96–106)
CO2 SERPL-SCNC: 22 MMOL/L (ref 20–29)
CORTIS AM PEAK SERPL-MCNC: 9 UG/DL (ref 6.2–19.4)
CREAT SERPL-MCNC: 1.03 MG/DL (ref 0.76–1.27)
FSH SERPL-ACNC: 2.4 MIU/ML (ref 1.5–12.4)
GLOBULIN SER-MCNC: 2.3 G/DL (ref 1.5–4.5)
GLUCOSE SERPL-MCNC: 93 MG/DL (ref 65–99)
IGF-I SERPL-MCNC: 196 NG/ML (ref 54–194)
LH SERPL-ACNC: 5.2 MIU/ML (ref 1.7–8.6)
POTASSIUM SERPL-SCNC: 4.4 MMOL/L (ref 3.5–5.2)
PROLACTIN SERPL-MCNC: 5 NG/ML (ref 4–15.2)
PROT SERPL-MCNC: 6.6 G/DL (ref 6–8.5)
SL AMB EGFR AFRICAN AMERICAN: 93 ML/MIN/1.73
SL AMB EGFR NON AFRICAN AMERICAN: 81 ML/MIN/1.73
SODIUM SERPL-SCNC: 142 MMOL/L (ref 134–144)
T4 FREE SERPL-MCNC: 1.47 NG/DL (ref 0.82–1.77)
TESTOST FREE SERPL-MCNC: 4.9 PG/ML (ref 7.2–24)
TESTOST SERPL-MCNC: 221 NG/DL (ref 264–916)
TSH SERPL DL<=0.005 MIU/L-ACNC: 0.87 UIU/ML (ref 0.45–4.5)

## 2020-01-30 ENCOUNTER — TELEPHONE (OUTPATIENT)
Dept: ENDOCRINOLOGY | Facility: CLINIC | Age: 55
End: 2020-01-30

## 2020-01-30 DIAGNOSIS — E23.7 PITUITARY LESION (HCC): Primary | ICD-10-CM

## 2020-01-30 NOTE — TELEPHONE ENCOUNTER
Madelaine Tomas,    Please call pt and let him know his labs    -his IGF-1 level was very mildly elevated at 196 (normal <194)  -his testosterone is mildly low at 221 (normal >264)    I would like to repeat the IGF-1 level to make sure it is actually normal  Ordered in EMR  If the repeat IGF-1 level comes back normal then will repeat all the labs in 6 months before next appt  Bryant CRUZ    Endocrinology    Component      Latest Ref Rng & Units 1/24/2020   Glucose, Random      65 - 99 mg/dL 93   BUN      6 - 24 mg/dL 14   Creatinine      0 76 - 1 27 mg/dL 1 03   eGFR Non       >59 mL/min/1 73 81   eGFR       >59 mL/min/1 73 93   SL AMB BUN/CREATININE RATIO      9 - 20 14   Sodium      134 - 144 mmol/L 142   Potassium      3 5 - 5 2 mmol/L 4 4   Chloride      96 - 106 mmol/L 102   CO2      20 - 29 mmol/L 22   CALCIUM      8 7 - 10 2 mg/dL 9 3   Total Protein      6 0 - 8 5 g/dL 6 6   Albumin      3 8 - 4 9 g/dL 4 3   Globulin, Total      1 5 - 4 5 g/dL 2 3   Albumin/Globulin Ratio      1 2 - 2 2 1 9   TOTAL BILIRUBIN      0 0 - 1 2 mg/dL 1 0   ALKALINE PHOSPHATASE ISOENZYMES      39 - 117 IU/L 94   AST      0 - 40 IU/L 18   ALT      0 - 44 IU/L 10   Free T4      0 82 - 1 77 ng/dL 1 47   Testosterone, Total, LC/MS      264 - 916 ng/dL 221 (L)   TSH, POC      0 450 - 4 500 uIU/mL 0 868   LUTEINIZING HORMONE      1 7 - 8 6 mIU/mL 5 2   FSH, POC      1 5 - 12 4 mIU/mL 2 4   PROLACTIN      4 0 - 15 2 ng/mL 5 0   INSULIN-LIKE GROWTH FACTOR-1      54 - 194 ng/mL 196 (H)   TESTOSTERONE FREE      7 2 - 24 0 pg/mL 4 9 (L)   Cortisol AM      6 2 - 19 4 ug/dL 9 0

## 2020-02-09 LAB — IGF-I SERPL-MCNC: 201 NG/ML (ref 54–194)

## 2020-02-12 ENCOUNTER — TELEPHONE (OUTPATIENT)
Dept: ENDOCRINOLOGY | Facility: CLINIC | Age: 55
End: 2020-02-12

## 2020-02-12 NOTE — TELEPHONE ENCOUNTER
Patient called regarding his lab results  He has questions about what the numbers mean in regards to IGF-1  If the levels come back the same what are his next moves?

## 2020-02-14 DIAGNOSIS — R79.89 ELEVATED INSULIN-LIKE GROWTH FACTOR 1 (IGF-1) LEVEL: Primary | ICD-10-CM

## 2020-02-14 DIAGNOSIS — E23.7 PITUITARY LESION (HCC): ICD-10-CM

## 2020-02-14 NOTE — TELEPHONE ENCOUNTER
I called patient and explained his repeat IGF-1 was mildly elevated  -will do 2hr glucose tolerance test to r/o acromegaly, will be done at [facility]  -he has no symptoms of hypogonadism, does not need testosterone replacement    Likely has false positive IGF-1 elevation  Kirstin CRUZ    Endocrinology

## 2020-03-06 LAB
GH SP1 P CHAL SERPL-MCNC: 0.1 NG/ML
GH SP2 P CHAL SERPL-MCNC: 0.1 NG/ML
GLUCOSE 1.5H P 75 G GLC PO SERPL-MCNC: NORMAL MG/DL
GLUCOSE 1H P 75 G GLC PO SERPL-MCNC: 117 MG/DL (ref 65–199)
GLUCOSE 2H P 75 G GLC PO SERPL-MCNC: 110 MG/DL (ref 65–139)
GLUCOSE 30M P 75 G GLC PO SERPL-MCNC: NORMAL MG/DL
GLUCOSE 3H P 75 G GLC PO SERPL-MCNC: NORMAL MG/DL
GLUCOSE 4H P 75 G GLC PO SERPL-MCNC: NORMAL MG/DL
GLUCOSE 5H P 75 G GLC PO SERPL-MCNC: NORMAL MG/DL
GLUCOSE 6H P 75 G GLC PO SERPL-MCNC: NORMAL MG/DL
GLUCOSE P FAST SERPL-MCNC: 92 MG/DL (ref 65–99)
Lab: NORMAL
SOMATOTROPIN [MASS/VOLUME] IN SERUM OR PLASMA --BASELINE: 0.9 NG/ML (ref 0–10)

## 2020-03-11 ENCOUNTER — TELEPHONE (OUTPATIENT)
Dept: ENDOCRINOLOGY | Facility: CLINIC | Age: 55
End: 2020-03-11

## 2020-03-11 NOTE — TELEPHONE ENCOUNTER
Wisam Interiano,    Please let patient know his oral glucose suppression test was normal     We will repeat his MRI of the pituitary gland in July 2020 before next appointment  Christy CRUZ    Endocrinology

## 2020-03-12 ENCOUNTER — TELEPHONE (OUTPATIENT)
Dept: ENDOCRINOLOGY | Facility: CLINIC | Age: 55
End: 2020-03-12

## 2020-06-14 ENCOUNTER — HOSPITAL ENCOUNTER (EMERGENCY)
Facility: HOSPITAL | Age: 55
Discharge: HOME | End: 2020-06-14
Attending: EMERGENCY MEDICINE | Admitting: EMERGENCY MEDICINE
Payer: COMMERCIAL

## 2020-06-14 VITALS
SYSTOLIC BLOOD PRESSURE: 150 MMHG | RESPIRATION RATE: 16 BRPM | HEART RATE: 85 BPM | TEMPERATURE: 97.3 F | DIASTOLIC BLOOD PRESSURE: 73 MMHG | BODY MASS INDEX: 38.65 KG/M2 | WEIGHT: 285 LBS | OXYGEN SATURATION: 97 %

## 2020-06-14 DIAGNOSIS — R56.9 SEIZURE (HCC): Primary | ICD-10-CM

## 2020-06-14 LAB
ANION GAP SERPL CALCULATED.3IONS-SCNC: 6 MMOL/L (ref 4–13)
BASOPHILS # BLD AUTO: 0.02 THOUSANDS/ΜL (ref 0–0.1)
BASOPHILS NFR BLD AUTO: 0 % (ref 0–1)
BUN SERPL-MCNC: 14 MG/DL (ref 5–25)
CALCIUM SERPL-MCNC: 9.2 MG/DL (ref 8.3–10.1)
CHLORIDE SERPL-SCNC: 104 MMOL/L (ref 100–108)
CO2 SERPL-SCNC: 28 MMOL/L (ref 21–32)
CREAT SERPL-MCNC: 1.2 MG/DL (ref 0.6–1.3)
EOSINOPHIL # BLD AUTO: 0.09 THOUSAND/ΜL (ref 0–0.61)
EOSINOPHIL NFR BLD AUTO: 1 % (ref 0–6)
ERYTHROCYTE [DISTWIDTH] IN BLOOD BY AUTOMATED COUNT: 13.6 % (ref 11.6–15.1)
GFR SERPL CREATININE-BSD FRML MDRD: 78 ML/MIN/1.73SQ M
GLUCOSE SERPL-MCNC: 100 MG/DL (ref 65–140)
GLUCOSE SERPL-MCNC: 97 MG/DL (ref 65–140)
HCT VFR BLD AUTO: 45.5 % (ref 36.5–49.3)
HGB BLD-MCNC: 15 G/DL (ref 12–17)
IMM GRANULOCYTES # BLD AUTO: 0.03 THOUSAND/UL (ref 0–0.2)
IMM GRANULOCYTES NFR BLD AUTO: 0 % (ref 0–2)
LYMPHOCYTES # BLD AUTO: 2.29 THOUSANDS/ΜL (ref 0.6–4.47)
LYMPHOCYTES NFR BLD AUTO: 22 % (ref 14–44)
MAGNESIUM SERPL-MCNC: 2.4 MG/DL (ref 1.6–2.6)
MCH RBC QN AUTO: 29.8 PG (ref 26.8–34.3)
MCHC RBC AUTO-ENTMCNC: 33 G/DL (ref 31.4–37.4)
MCV RBC AUTO: 90 FL (ref 82–98)
MONOCYTES # BLD AUTO: 0.68 THOUSAND/ΜL (ref 0.17–1.22)
MONOCYTES NFR BLD AUTO: 7 % (ref 4–12)
NEUTROPHILS # BLD AUTO: 7.12 THOUSANDS/ΜL (ref 1.85–7.62)
NEUTS SEG NFR BLD AUTO: 70 % (ref 43–75)
NRBC BLD AUTO-RTO: 0 /100 WBCS
PLATELET # BLD AUTO: 181 THOUSANDS/UL (ref 149–390)
PMV BLD AUTO: 10.7 FL (ref 8.9–12.7)
POTASSIUM SERPL-SCNC: 3.6 MMOL/L (ref 3.5–5.3)
RBC # BLD AUTO: 5.04 MILLION/UL (ref 3.88–5.62)
SODIUM SERPL-SCNC: 138 MMOL/L (ref 136–145)
WBC # BLD AUTO: 10.23 THOUSAND/UL (ref 4.31–10.16)

## 2020-06-14 PROCEDURE — 36415 COLL VENOUS BLD VENIPUNCTURE: CPT | Performed by: EMERGENCY MEDICINE

## 2020-06-14 PROCEDURE — 99284 EMERGENCY DEPT VISIT MOD MDM: CPT | Performed by: EMERGENCY MEDICINE

## 2020-06-14 PROCEDURE — 99284 EMERGENCY DEPT VISIT MOD MDM: CPT

## 2020-06-14 PROCEDURE — 96365 THER/PROPH/DIAG IV INF INIT: CPT

## 2020-06-14 PROCEDURE — 82948 REAGENT STRIP/BLOOD GLUCOSE: CPT

## 2020-06-14 PROCEDURE — 85025 COMPLETE CBC W/AUTO DIFF WBC: CPT | Performed by: EMERGENCY MEDICINE

## 2020-06-14 PROCEDURE — 80048 BASIC METABOLIC PNL TOTAL CA: CPT | Performed by: EMERGENCY MEDICINE

## 2020-06-14 PROCEDURE — 83735 ASSAY OF MAGNESIUM: CPT | Performed by: EMERGENCY MEDICINE

## 2020-06-14 RX ORDER — BUTALBITAL, ACETAMINOPHEN AND CAFFEINE 50; 325; 40 MG/1; MG/1; MG/1
1 TABLET ORAL ONCE
Status: COMPLETED | OUTPATIENT
Start: 2020-06-14 | End: 2020-06-14

## 2020-06-14 RX ADMIN — BUTALBITAL, ACETAMINOPHEN AND CAFFEINE 1 TABLET: 50; 325; 40 TABLET ORAL at 04:50

## 2020-06-14 RX ADMIN — Medication 1000 MG: at 03:54

## 2020-07-31 ENCOUNTER — TELEPHONE (OUTPATIENT)
Dept: ENDOCRINOLOGY | Facility: CLINIC | Age: 55
End: 2020-07-31

## 2020-08-04 NOTE — TELEPHONE ENCOUNTER
What labs is he referring to? I don't think he needs any repeat labs    He does need a repeat MRI pituitary gland    Is that what he is referring to?

## 2020-08-07 DIAGNOSIS — E23.7 PITUITARY LESION (HCC): Primary | ICD-10-CM

## 2020-08-07 NOTE — TELEPHONE ENCOUNTER
He only needs a repeat MRI pituitary right now (ordered) please give him central scheduling phone # to schedule

## 2020-08-21 LAB — IGF-I SERPL-MCNC: 171 NG/ML (ref 68–247)

## 2020-08-25 ENCOUNTER — TELEPHONE (OUTPATIENT)
Dept: ENDOCRINOLOGY | Facility: CLINIC | Age: 55
End: 2020-08-25

## 2020-09-01 ENCOUNTER — TELEPHONE (OUTPATIENT)
Dept: ENDOCRINOLOGY | Facility: CLINIC | Age: 55
End: 2020-09-01

## 2020-09-01 NOTE — TELEPHONE ENCOUNTER
I received no report- [facility] please ask patient where he had it done and call them to obtain report

## 2020-09-02 NOTE — TELEPHONE ENCOUNTER
Pt returned call  He advised that he had MRI at [facility] in [place], [facility] [address]  He also advised that he has a disc with the MRI images, I let him know we do not have the capability in the office to obtain the images, but he cam take the disc to the hospital and they may be able to obtain and scan into his chart  Called [facility], no answer  Will try again later

## 2020-09-08 NOTE — TELEPHONE ENCOUNTER
Called [facility] in [facility], [address] [place] [address], spoke to Herson babin  She will fax MRI report

## 2020-09-14 NOTE — TELEPHONE ENCOUNTER
Pt call;ed looking for MRI results  I reviewed chart and advised pt that I called on 9/8 to [facility] in [facility], [address] [facility] [address] and they were suppose to fax over but never did  Advised I will call again  Called [facility] in [facility], [address], spoke to Herson babin  She stated she will fax the MRI report

## 2020-09-15 ENCOUNTER — TELEPHONE (OUTPATIENT)
Dept: ENDOCRINOLOGY | Facility: CLINIC | Age: 55
End: 2020-09-15

## 2020-09-15 NOTE — TELEPHONE ENCOUNTER
Please let pt know his MRI pituitary gland shows stable pituitary stalk deviation to the left with 1 1cm asymmetric left pituitary tissue---unchanged from prior MRI---repeat MRI pituitary in 1 year to re-assess stability

## 2020-09-17 DIAGNOSIS — E27.8 ADRENAL MASS (HCC): ICD-10-CM

## 2020-09-17 DIAGNOSIS — C73 PAPILLARY CARCINOMA OF THYROID (HCC): Primary | ICD-10-CM

## 2020-09-17 DIAGNOSIS — E27.40 ADRENAL INSUFFICIENCY (HCC): ICD-10-CM

## 2020-09-17 DIAGNOSIS — D35.2 PITUITARY ADENOMA (HCC): ICD-10-CM

## 2020-09-17 DIAGNOSIS — E23.7 PITUITARY LESION (HCC): ICD-10-CM

## 2020-09-18 NOTE — TELEPHONE ENCOUNTER
Spoke with [place] Tyra this morning and she confirmed she faxed the results to [phone] 3 times  She will fax again to your attention

## 2020-10-12 ENCOUNTER — TELEPHONE (OUTPATIENT)
Dept: ENDOCRINOLOGY | Facility: CLINIC | Age: 55
End: 2020-10-12

## 2021-08-24 ENCOUNTER — IMMUNIZATIONS (OUTPATIENT)
Dept: FAMILY MEDICINE CLINIC | Facility: HOSPITAL | Age: 56
End: 2021-08-24

## 2021-08-24 DIAGNOSIS — Z23 ENCOUNTER FOR IMMUNIZATION: Primary | ICD-10-CM

## 2021-08-24 PROCEDURE — 0011A SARS-COV-2 / COVID-19 MRNA VACCINE (MODERNA) 100 MCG: CPT

## 2021-08-24 PROCEDURE — 91301 SARS-COV-2 / COVID-19 MRNA VACCINE (MODERNA) 100 MCG: CPT

## 2021-09-21 ENCOUNTER — IMMUNIZATIONS (OUTPATIENT)
Dept: FAMILY MEDICINE CLINIC | Facility: HOSPITAL | Age: 56
End: 2021-09-21

## 2021-09-21 DIAGNOSIS — Z23 ENCOUNTER FOR IMMUNIZATION: Primary | ICD-10-CM

## 2021-09-21 PROCEDURE — 0012A SARS-COV-2 / COVID-19 MRNA VACCINE (MODERNA) 100 MCG: CPT

## 2021-09-21 PROCEDURE — 91301 SARS-COV-2 / COVID-19 MRNA VACCINE (MODERNA) 100 MCG: CPT

## 2022-04-15 ENCOUNTER — IMMUNIZATIONS (OUTPATIENT)
Dept: FAMILY MEDICINE CLINIC | Facility: HOSPITAL | Age: 57
End: 2022-04-15

## 2022-04-15 DIAGNOSIS — Z23 ENCOUNTER FOR IMMUNIZATION: Primary | ICD-10-CM

## 2022-04-15 PROCEDURE — 91306 COVID-19 MODERNA VACC 0.25 ML BOOSTER: CPT

## 2022-04-15 PROCEDURE — 0064A COVID-19 MODERNA VACC 0.25 ML BOOSTER: CPT

## 2024-07-08 ENCOUNTER — APPOINTMENT (OUTPATIENT)
Dept: LAB | Facility: HOSPITAL | Age: 59
End: 2024-07-08
Payer: COMMERCIAL

## 2024-07-08 ENCOUNTER — HOSPITAL ENCOUNTER (OUTPATIENT)
Dept: RADIOLOGY | Facility: HOSPITAL | Age: 59
Discharge: HOME | End: 2024-07-08
Attending: SPECIALIST
Payer: COMMERCIAL

## 2024-07-08 DIAGNOSIS — N50.82 SCROTAL PAIN: ICD-10-CM

## 2024-07-08 PROCEDURE — 76870 US EXAM SCROTUM: CPT

## 2025-03-05 NOTE — TELEPHONE ENCOUNTER
Left message to call office to discuss lab results 
Please let pt know IGF-1 level is normal
Pt returned called, advised that IGF-1 is normal 
advanced age, chronic illness/other (specify)